# Patient Record
Sex: MALE | Race: WHITE | Employment: FULL TIME | URBAN - METROPOLITAN AREA
[De-identification: names, ages, dates, MRNs, and addresses within clinical notes are randomized per-mention and may not be internally consistent; named-entity substitution may affect disease eponyms.]

---

## 2022-05-19 ENCOUNTER — HOSPITAL ENCOUNTER (EMERGENCY)
Age: 42
Discharge: HOME | End: 2022-05-19
Payer: COMMERCIAL

## 2022-05-19 VITALS
SYSTOLIC BLOOD PRESSURE: 127 MMHG | WEIGHT: 220 LBS | HEIGHT: 77 IN | TEMPERATURE: 98.7 F | RESPIRATION RATE: 18 BRPM | BODY MASS INDEX: 25.98 KG/M2 | HEART RATE: 94 BPM | DIASTOLIC BLOOD PRESSURE: 74 MMHG | OXYGEN SATURATION: 95 %

## 2022-05-19 DIAGNOSIS — R11.2 NAUSEA AND VOMITING, INTRACTABILITY OF VOMITING NOT SPECIFIED, UNSPECIFIED VOMITING TYPE: Primary | ICD-10-CM

## 2022-05-19 DIAGNOSIS — B34.9 VIRAL ILLNESS: ICD-10-CM

## 2022-05-19 LAB
INFLUENZA A BY PCR: NEGATIVE
INFLUENZA B BY PCR: NEGATIVE
STREP GRP A PCR: NEGATIVE

## 2022-05-19 PROCEDURE — 87502 INFLUENZA DNA AMP PROBE: CPT

## 2022-05-19 PROCEDURE — 6370000000 HC RX 637 (ALT 250 FOR IP): Performed by: NURSE PRACTITIONER

## 2022-05-19 PROCEDURE — 87651 STREP A DNA AMP PROBE: CPT

## 2022-05-19 PROCEDURE — 99283 EMERGENCY DEPT VISIT LOW MDM: CPT

## 2022-05-19 RX ORDER — ONDANSETRON 4 MG/1
4 TABLET, ORALLY DISINTEGRATING ORAL EVERY 12 HOURS PRN
Qty: 12 TABLET | Refills: 0 | Status: SHIPPED | OUTPATIENT
Start: 2022-05-19

## 2022-05-19 RX ORDER — ONDANSETRON 4 MG/1
4 TABLET, ORALLY DISINTEGRATING ORAL ONCE
Status: COMPLETED | OUTPATIENT
Start: 2022-05-19 | End: 2022-05-19

## 2022-05-19 RX ADMIN — ONDANSETRON 4 MG: 4 TABLET, ORALLY DISINTEGRATING ORAL at 15:49

## 2022-05-19 ASSESSMENT — ENCOUNTER SYMPTOMS
BACK PAIN: 0
SHORTNESS OF BREATH: 0
DIARRHEA: 0
VOMITING: 1
ABDOMINAL PAIN: 0
NAUSEA: 1
SORE THROAT: 0
COUGH: 0
TROUBLE SWALLOWING: 0

## 2022-05-19 ASSESSMENT — PAIN - FUNCTIONAL ASSESSMENT: PAIN_FUNCTIONAL_ASSESSMENT: NONE - DENIES PAIN

## 2022-05-19 NOTE — ED PROVIDER NOTES
[address] [facility] ED  eMERGENCY dEPARTMENT eNCOUnter      Pt Name: Susan Paz  MRN: [id number]  [place] 1980  Date of evaluation: 5/19/2022  Provider: OLENA Liriano CNP      HISTORY OF PRESENT ILLNESS    Susan Paz is a 42 y.o. male who presents to the Emergency Department with intermittent nausea and vomiting x 2 days. Patient is able t at and drink and keep it down. He denies any abdominal pain. State she has not had a BM in a couple days but has not been eating much either. No pain. Denies recent illness, fever, cough, sore throat myalgias. REVIEW OF SYSTEMS       Review of Systems   Constitutional: Negative for activity change, appetite change and fever. HENT: Negative for congestion, drooling, ear pain, sore throat and trouble swallowing. Respiratory: Negative for cough and shortness of breath. Cardiovascular: Negative for chest pain. Gastrointestinal: Positive for nausea and vomiting. Negative for abdominal pain and diarrhea. Genitourinary: Negative for dysuria. Musculoskeletal: Negative for arthralgias, back pain and neck pain. Skin: Negative for rash. Neurological: Negative for dizziness, syncope, light-headedness and headaches. All other systems reviewed and are negative. PAST MEDICAL HISTORY   History reviewed. No pertinent past medical history. SURGICAL HISTORY     History reviewed. No pertinent surgical history. CURRENT MEDICATIONS       Previous Medications    No medications on file       ALLERGIES     Patient has no allergy information on record. FAMILY HISTORY     History reviewed. No pertinent family history.        SOCIAL HISTORY       Social History     Socioeconomic History    Marital status: Single     Spouse name: None    Number of children: None    Years of education: None    Highest education level: None   Occupational History    None   Tobacco Use    Smoking status: Current Every Day Smoker     Types: 94 18 95 % 6' 5\" (1.956 m) 220 lb (99.8 kg)       Physical Exam  Vitals and nursing note reviewed. Constitutional:       Appearance: He is well-developed. HENT:      Head: Normocephalic and atraumatic. Right Ear: Hearing, tympanic membrane, ear canal and external ear normal.      Left Ear: Hearing, tympanic membrane, ear canal and external ear normal.      Nose: Nose normal.      Mouth/Throat:      Lips: Pink. Mouth: Mucous membranes are moist.      Pharynx: Oropharynx is clear. Uvula midline. Eyes:      Conjunctiva/sclera: Conjunctivae normal.      Pupils: Pupils are equal, round, and reactive to light. Cardiovascular:      Rate and Rhythm: Normal rate and regular rhythm. Heart sounds: Normal heart sounds. Pulmonary:      Effort: Pulmonary effort is normal. No accessory muscle usage or respiratory distress. Breath sounds: Normal breath sounds. No decreased air movement. No decreased breath sounds, wheezing or rhonchi. Abdominal:      General: Abdomen is flat. Bowel sounds are normal. There is no distension. Palpations: Abdomen is soft. Tenderness: There is no abdominal tenderness. Musculoskeletal:         General: Normal range of motion. Cervical back: Normal range of motion and neck supple. Skin:     General: Skin is warm and dry. Neurological:      General: No focal deficit present. Mental Status: He is alert and oriented to person, place, and time. GCS: GCS eye subscore is 4. GCS verbal subscore is 5. GCS motor subscore is 6. Deep Tendon Reflexes: Reflexes are normal and symmetric. Psychiatric:         Judgment: Judgment normal.           All other labs were within normal range or not returned as of this dictation.     EMERGENCY DEPARTMENT COURSE and DIFFERENTIALDIAGNOSIS/MDM:   Vitals:    Vitals:    05/19/22 1526 05/19/22 1528   BP:  127/74   Pulse: 94    Resp: 18    Temp: 98.7 °F (37.1 °C)    TempSrc: Oral    SpO2: 95%    Weight: 220 lb (99.8 kg)    Height: 6' 5\" (1.956 m)             42 yr old male with N/V that has resolved, suspect viral illness. Return to ER if worse. F/U With PCP in 5 days. Patient is comfortable at discharge and verbalizes understanding. PROCEDURES:  Unless otherwise noted below, none     Procedures      FINAL IMPRESSION      1. Nausea and vomiting, intractability of vomiting not specified, unspecified vomiting type    2.  Viral illness          DISPOSITION/PLAN   DISPOSITION Decision To Discharge 05/19/2022 04:19:51 PM          Agustin Moritz, APRN - CNP (electronically signed)  Attending Emergency Physician     Agustin Moritz, APRN - CNP  05/19/22 [id number]

## 2022-05-19 NOTE — Clinical Note
Rcok Sparsk was seen and treated in our emergency department on 5/19/2022. He may return to work on 05/20/2022. If you have any questions or concerns, please don't hesitate to call.       Afia Bonilla, OLENA - CNP

## 2022-05-19 NOTE — Clinical Note
Todd Michael was seen and treated in our emergency department on 5/19/2022. He may return to work on 05/20/2022. If you have any questions or concerns, please don't hesitate to call.       Gurdeep Rater, APRN - CNP